# Patient Record
Sex: FEMALE | Race: WHITE | Employment: FULL TIME | URBAN - METROPOLITAN AREA
[De-identification: names, ages, dates, MRNs, and addresses within clinical notes are randomized per-mention and may not be internally consistent; named-entity substitution may affect disease eponyms.]

---

## 2023-10-11 ENCOUNTER — OFFICE VISIT (OUTPATIENT)
Age: 58
End: 2023-10-11

## 2023-10-11 VITALS
SYSTOLIC BLOOD PRESSURE: 132 MMHG | WEIGHT: 150 LBS | BODY MASS INDEX: 23.54 KG/M2 | OXYGEN SATURATION: 97 % | DIASTOLIC BLOOD PRESSURE: 79 MMHG | HEIGHT: 67 IN | TEMPERATURE: 98.7 F | HEART RATE: 68 BPM

## 2023-10-11 DIAGNOSIS — M54.41 ACUTE RIGHT-SIDED LOW BACK PAIN WITH RIGHT-SIDED SCIATICA: Primary | ICD-10-CM

## 2023-10-11 RX ORDER — ESTRADIOL 0.03 MG/D
1 FILM, EXTENDED RELEASE TRANSDERMAL
COMMUNITY

## 2023-10-11 RX ORDER — CHOLESTYRAMINE 4 G/9G
1 POWDER, FOR SUSPENSION ORAL
COMMUNITY

## 2023-10-11 RX ORDER — KETOROLAC TROMETHAMINE 30 MG/ML
30 INJECTION, SOLUTION INTRAMUSCULAR; INTRAVENOUS ONCE
Status: COMPLETED | OUTPATIENT
Start: 2023-10-11 | End: 2023-10-11

## 2023-10-11 RX ADMIN — KETOROLAC TROMETHAMINE 30 MG: 30 INJECTION, SOLUTION INTRAMUSCULAR; INTRAVENOUS at 12:22

## 2023-10-11 NOTE — PROGRESS NOTES
Mine Hernandez (:  1965) is a 66 y.o. female,New patient, here for evaluation of the following chief complaint(s):  Back Pain (Hx of sciatic pain)      ASSESSMENT/PLAN:  Visit Diagnoses and Associated Orders       Acute right-sided low back pain with right-sided sciatica    -  Primary    ketorolac (TORADOL) injection 30 mg [40816]           ORDERS WITHOUT AN ASSOCIATED DIAGNOSIS    estradiol 0.025 MG/24HR PTTW [66773]      cholestyramine (QUESTRAN) 4 g packet [2072]               Positive R SLR. Discussed prednisone taper versus Toradol benefits. At this time patient wishes to proceed with Toradol injection, denies history of GIB, gastric ulcers, CVA, CKD. Toradol 30 mg IM administered on scene for acute pain relief. Advised to use heat, ice, Tylenol 650 to 1000 mg every 6 hours as needed for breakthrough pain. To hold NSAIDs for 24 hours after Toradol administration. To follow-up with spinal specialist as previously scheduled. To monitor symptoms and follow-up if symptoms worsen or do not improve on current treatment plan. To ED for severe back pain unresponsive to OTC medications, lower extremity weakness, perianal numbness, new bowel or bladder incontinence, rapid worsening symptoms      Follow up in as needed days if symptoms persist or if symptoms worsen. SUBJECTIVE/OBJECTIVE:  HPI    66 y.o. female presents with symptoms of  Low Back Pain  Patient complains of chronic low back pain. This is evaluated as a personal injury. The patient first noted symptoms 3 days ago. Reports h/o sciatica 2/2 DDD diagnosed in 2022. Follow[facility], planned medial nerve block in 2 weeks. It was not related to no known injury, remote injury, falling, twisting, lifting heavy object, jumping from height, mva, mca, and pedestrian accident. The pain is rated 9 /10, and is located at the right lumbar area.  The pain is described as stabbing, throbbing, or shooting radiating to R calf and occurs  all

## 2023-10-14 ENCOUNTER — TELEPHONE (OUTPATIENT)
Age: 58
End: 2023-10-14

## 2023-10-14 NOTE — TELEPHONE ENCOUNTER
----- Message from Mikell Skiff sent at 10/11/2023 10:27 AM EDT -----  Regarding: Patient follow up remindercall  Please make a follow up call to patient to ensure they have picked up and started any medications. If condition does not improve, please advise patient to follow up with PCP.